# Patient Record
Sex: MALE | Race: WHITE | NOT HISPANIC OR LATINO | URBAN - METROPOLITAN AREA
[De-identification: names, ages, dates, MRNs, and addresses within clinical notes are randomized per-mention and may not be internally consistent; named-entity substitution may affect disease eponyms.]

---

## 2023-03-06 ENCOUNTER — OFFICE (OUTPATIENT)
Dept: URBAN - METROPOLITAN AREA CLINIC 11 | Facility: CLINIC | Age: 38
End: 2023-03-06

## 2023-03-06 VITALS
OXYGEN SATURATION: 99 % | DIASTOLIC BLOOD PRESSURE: 88 MMHG | WEIGHT: 222 LBS | SYSTOLIC BLOOD PRESSURE: 138 MMHG | HEIGHT: 76 IN | HEART RATE: 88 BPM

## 2023-03-06 DIAGNOSIS — K92.2 GASTROINTESTINAL HEMORRHAGE, UNSPECIFIED: ICD-10-CM

## 2023-03-06 DIAGNOSIS — R19.7 DIARRHEA, UNSPECIFIED: ICD-10-CM

## 2023-03-06 PROCEDURE — 99204 OFFICE O/P NEW MOD 45 MIN: CPT | Performed by: INTERNAL MEDICINE

## 2023-03-06 RX ORDER — POLYETHYLENE GLYCOL 3350, SODIUM SULFATE, SODIUM CHLORIDE, POTASSIUM CHLORIDE, ASCORBIC ACID, SODIUM ASCORBATE 140-9-5.2G
KIT ORAL
Qty: 1 | Refills: 0 | Status: COMPLETED
Start: 2023-03-06 | End: 2023-06-06

## 2023-03-08 LAB
C-REACTIVE PROTEIN, QUANT: 2 MG/L (ref 0–10)
CBC, PLATELET, NO DIFFERENTIAL: HEMATOCRIT: 45 % (ref 37.5–51)
CBC, PLATELET, NO DIFFERENTIAL: HEMOGLOBIN: 15 G/DL (ref 13–17.7)
CBC, PLATELET, NO DIFFERENTIAL: MCH: 30.2 PG (ref 26.6–33)
CBC, PLATELET, NO DIFFERENTIAL: MCHC: 33.3 G/DL (ref 31.5–35.7)
CBC, PLATELET, NO DIFFERENTIAL: MCV: 91 FL (ref 79–97)
CBC, PLATELET, NO DIFFERENTIAL: PLATELETS: 369 X10E3/UL (ref 150–450)
CBC, PLATELET, NO DIFFERENTIAL: RBC: 4.96 X10E6/UL (ref 4.14–5.8)
CBC, PLATELET, NO DIFFERENTIAL: RDW: 12.5 % (ref 11.6–15.4)
CBC, PLATELET, NO DIFFERENTIAL: WBC: 7.4 X10E3/UL (ref 3.4–10.8)
COMP. METABOLIC PANEL (14): A/G RATIO: 2.2 (ref 1.2–2.2)
COMP. METABOLIC PANEL (14): ALBUMIN: 4.8 G/DL (ref 4–5)
COMP. METABOLIC PANEL (14): ALKALINE PHOSPHATASE: 69 IU/L (ref 44–121)
COMP. METABOLIC PANEL (14): ALT (SGPT): 40 IU/L (ref 0–44)
COMP. METABOLIC PANEL (14): AST (SGOT): 22 IU/L (ref 0–40)
COMP. METABOLIC PANEL (14): BILIRUBIN, TOTAL: 0.2 MG/DL (ref 0–1.2)
COMP. METABOLIC PANEL (14): BUN/CREATININE RATIO: 12 (ref 9–20)
COMP. METABOLIC PANEL (14): BUN: 12 MG/DL (ref 6–20)
COMP. METABOLIC PANEL (14): CALCIUM: 10 MG/DL (ref 8.7–10.2)
COMP. METABOLIC PANEL (14): CARBON DIOXIDE, TOTAL: 24 MMOL/L (ref 20–29)
COMP. METABOLIC PANEL (14): CHLORIDE: 102 MMOL/L (ref 96–106)
COMP. METABOLIC PANEL (14): CREATININE: 1 MG/DL (ref 0.76–1.27)
COMP. METABOLIC PANEL (14): EGFR: 98 ML/MIN/1.73 (ref 59–?)
COMP. METABOLIC PANEL (14): GLOBULIN, TOTAL: 2.2 G/DL (ref 1.5–4.5)
COMP. METABOLIC PANEL (14): GLUCOSE: 86 MG/DL (ref 70–99)
COMP. METABOLIC PANEL (14): POTASSIUM: 4.2 MMOL/L (ref 3.5–5.2)
COMP. METABOLIC PANEL (14): PROTEIN, TOTAL: 7 G/DL (ref 6–8.5)
COMP. METABOLIC PANEL (14): SODIUM: 142 MMOL/L (ref 134–144)
ENDOMYSIAL ANTIBODY IGA: NEGATIVE
SEDIMENTATION RATE-WESTERGREN: 2 MM/HR (ref 0–15)
T-TRANSGLUTAMINASE (TTG) IGA: <2 U/ML

## 2023-06-06 ENCOUNTER — AMBULATORY SURGICAL CENTER (OUTPATIENT)
Dept: URBAN - METROPOLITAN AREA SURGERY 3 | Facility: SURGERY | Age: 38
End: 2023-06-06
Payer: COMMERCIAL

## 2023-06-06 ENCOUNTER — OFFICE (OUTPATIENT)
Dept: URBAN - METROPOLITAN AREA PATHOLOGY 12 | Facility: PATHOLOGY | Age: 38
End: 2023-06-06
Payer: COMMERCIAL

## 2023-06-06 ENCOUNTER — AMBULATORY SURGICAL CENTER (OUTPATIENT)
Dept: URBAN - METROPOLITAN AREA SURGERY 3 | Facility: SURGERY | Age: 38
End: 2023-06-06

## 2023-06-06 VITALS
DIASTOLIC BLOOD PRESSURE: 87 MMHG | SYSTOLIC BLOOD PRESSURE: 137 MMHG | HEART RATE: 91 BPM | HEART RATE: 84 BPM | RESPIRATION RATE: 21 BRPM | HEART RATE: 78 BPM | HEART RATE: 91 BPM | RESPIRATION RATE: 21 BRPM | SYSTOLIC BLOOD PRESSURE: 133 MMHG | HEART RATE: 76 BPM | DIASTOLIC BLOOD PRESSURE: 71 MMHG | TEMPERATURE: 98.5 F | SYSTOLIC BLOOD PRESSURE: 132 MMHG | OXYGEN SATURATION: 98 % | DIASTOLIC BLOOD PRESSURE: 89 MMHG | SYSTOLIC BLOOD PRESSURE: 137 MMHG | HEIGHT: 76 IN | SYSTOLIC BLOOD PRESSURE: 137 MMHG | RESPIRATION RATE: 18 BRPM | DIASTOLIC BLOOD PRESSURE: 85 MMHG | WEIGHT: 218 LBS | HEART RATE: 85 BPM | RESPIRATION RATE: 19 BRPM | HEART RATE: 85 BPM | HEART RATE: 78 BPM | TEMPERATURE: 98.5 F | OXYGEN SATURATION: 98 % | TEMPERATURE: 98.2 F | OXYGEN SATURATION: 99 % | RESPIRATION RATE: 18 BRPM | RESPIRATION RATE: 19 BRPM | RESPIRATION RATE: 18 BRPM | HEART RATE: 76 BPM | WEIGHT: 218 LBS | WEIGHT: 218 LBS | HEART RATE: 91 BPM | OXYGEN SATURATION: 99 % | DIASTOLIC BLOOD PRESSURE: 85 MMHG | SYSTOLIC BLOOD PRESSURE: 132 MMHG | HEART RATE: 76 BPM | DIASTOLIC BLOOD PRESSURE: 78 MMHG | OXYGEN SATURATION: 100 % | SYSTOLIC BLOOD PRESSURE: 135 MMHG | HEART RATE: 78 BPM | RESPIRATION RATE: 20 BRPM | TEMPERATURE: 98.2 F | HEIGHT: 76 IN | RESPIRATION RATE: 20 BRPM | DIASTOLIC BLOOD PRESSURE: 71 MMHG | OXYGEN SATURATION: 98 % | HEART RATE: 84 BPM | SYSTOLIC BLOOD PRESSURE: 133 MMHG | SYSTOLIC BLOOD PRESSURE: 133 MMHG | DIASTOLIC BLOOD PRESSURE: 89 MMHG | HEART RATE: 85 BPM | DIASTOLIC BLOOD PRESSURE: 87 MMHG | RESPIRATION RATE: 19 BRPM | DIASTOLIC BLOOD PRESSURE: 89 MMHG | RESPIRATION RATE: 21 BRPM | SYSTOLIC BLOOD PRESSURE: 135 MMHG | OXYGEN SATURATION: 99 % | SYSTOLIC BLOOD PRESSURE: 132 MMHG | DIASTOLIC BLOOD PRESSURE: 87 MMHG | DIASTOLIC BLOOD PRESSURE: 85 MMHG | DIASTOLIC BLOOD PRESSURE: 71 MMHG | OXYGEN SATURATION: 100 % | DIASTOLIC BLOOD PRESSURE: 78 MMHG | RESPIRATION RATE: 20 BRPM | SYSTOLIC BLOOD PRESSURE: 141 MMHG | SYSTOLIC BLOOD PRESSURE: 141 MMHG | SYSTOLIC BLOOD PRESSURE: 135 MMHG | OXYGEN SATURATION: 100 % | SYSTOLIC BLOOD PRESSURE: 141 MMHG | HEART RATE: 84 BPM | DIASTOLIC BLOOD PRESSURE: 78 MMHG | TEMPERATURE: 98.2 F | HEIGHT: 76 IN | TEMPERATURE: 98.5 F

## 2023-06-06 DIAGNOSIS — K92.1 MELENA: ICD-10-CM

## 2023-06-06 DIAGNOSIS — R19.7 DIARRHEA, UNSPECIFIED: ICD-10-CM

## 2023-06-06 DIAGNOSIS — K92.2 GASTROINTESTINAL HEMORRHAGE, UNSPECIFIED: ICD-10-CM

## 2023-06-06 DIAGNOSIS — K52.89 OTHER SPECIFIED NONINFECTIVE GASTROENTERITIS AND COLITIS: ICD-10-CM

## 2023-06-06 PROCEDURE — 45380 COLONOSCOPY AND BIOPSY: CPT | Performed by: INTERNAL MEDICINE

## 2023-06-06 PROCEDURE — 88305 TISSUE EXAM BY PATHOLOGIST: CPT | Performed by: PATHOLOGY

## 2023-06-06 NOTE — SERVICEHPINOTES
37-year-old male referred with history of abdominal pain and discomfort associated with altered bowel function with change in caliber stool and diarrhea issues. He has also had rectal bleeding intermittently with blood on the stool in the water and on the tissue. He has noted intermittent problems for the last 2 years but it seems to be getting worse. There is no family history of colon cancer, colitis, or polyps. He has no cardiac history and no chest pain or shortness of breath. He has at least 1 or 2 bowel movements every day and sometimes more. He will have diarrhea on at least 2 days in a week. He describes general discomfort without localization and without severe pain. There has been no weight loss.

## 2024-03-12 ENCOUNTER — OFFICE (OUTPATIENT)
Dept: URBAN - METROPOLITAN AREA CLINIC 11 | Facility: CLINIC | Age: 39
End: 2024-03-12

## 2024-03-12 VITALS
WEIGHT: 210 LBS | OXYGEN SATURATION: 96 % | HEART RATE: 89 BPM | DIASTOLIC BLOOD PRESSURE: 84 MMHG | SYSTOLIC BLOOD PRESSURE: 143 MMHG | HEIGHT: 76 IN

## 2024-03-12 DIAGNOSIS — R10.31 RIGHT LOWER QUADRANT PAIN: ICD-10-CM

## 2024-03-12 PROCEDURE — 99214 OFFICE O/P EST MOD 30 MIN: CPT | Performed by: INTERNAL MEDICINE

## 2024-03-22 ENCOUNTER — OFFICE (OUTPATIENT)
Dept: URBAN - METROPOLITAN AREA CLINIC 22 | Facility: CLINIC | Age: 39
End: 2024-03-22

## 2024-03-22 DIAGNOSIS — R10.31 RIGHT LOWER QUADRANT PAIN: ICD-10-CM

## 2024-03-22 PROCEDURE — 74177 CT ABD & PELVIS W/CONTRAST: CPT | Mod: TC | Performed by: INTERNAL MEDICINE
